# Patient Record
Sex: FEMALE | Race: WHITE
[De-identification: names, ages, dates, MRNs, and addresses within clinical notes are randomized per-mention and may not be internally consistent; named-entity substitution may affect disease eponyms.]

---

## 2021-01-27 ENCOUNTER — HOSPITAL ENCOUNTER (EMERGENCY)
Dept: HOSPITAL 35 - ER | Age: 76
Discharge: HOME | End: 2021-01-27
Payer: COMMERCIAL

## 2021-01-27 VITALS — SYSTOLIC BLOOD PRESSURE: 134 MMHG | DIASTOLIC BLOOD PRESSURE: 74 MMHG

## 2021-01-27 VITALS — WEIGHT: 267.99 LBS | HEIGHT: 70 IN | BODY MASS INDEX: 38.37 KG/M2

## 2021-01-27 DIAGNOSIS — E66.01: ICD-10-CM

## 2021-01-27 DIAGNOSIS — E10.51: ICD-10-CM

## 2021-01-27 DIAGNOSIS — Z79.899: ICD-10-CM

## 2021-01-27 DIAGNOSIS — E10.40: ICD-10-CM

## 2021-01-27 DIAGNOSIS — I10: ICD-10-CM

## 2021-01-27 DIAGNOSIS — Z79.4: ICD-10-CM

## 2021-01-27 DIAGNOSIS — Z90.49: ICD-10-CM

## 2021-01-27 DIAGNOSIS — Z79.82: ICD-10-CM

## 2021-01-27 DIAGNOSIS — U07.1: Primary | ICD-10-CM

## 2021-01-27 LAB
ALBUMIN SERPL-MCNC: 2.9 G/DL (ref 3.4–5)
ALT SERPL-CCNC: 22 U/L (ref 30–65)
ANION GAP SERPL CALC-SCNC: 12 MMOL/L (ref 7–16)
ANISOCYTOSIS BLD QL SMEAR: SLIGHT
AST SERPL-CCNC: 25 U/L (ref 15–37)
BASOPHILS NFR BLD AUTO: 0 % (ref 0–2)
BILIRUB SERPL-MCNC: 1.5 MG/DL (ref 0.2–1)
BILIRUB UR-MCNC: NEGATIVE MG/DL
BUN SERPL-MCNC: 29 MG/DL (ref 7–18)
CALCIUM SERPL-MCNC: 8.9 MG/DL (ref 8.5–10.1)
CHLORIDE SERPL-SCNC: 97 MMOL/L (ref 98–107)
CO2 SERPL-SCNC: 28 MMOL/L (ref 21–32)
COLOR UR: YELLOW
CREAT SERPL-MCNC: 1.4 MG/DL (ref 0.6–1)
EOSINOPHIL NFR BLD: 0 % (ref 0–3)
ERYTHROCYTE [DISTWIDTH] IN BLOOD BY AUTOMATED COUNT: 14.9 % (ref 10.5–14.5)
GLUCOSE SERPL-MCNC: 205 MG/DL (ref 74–106)
GRANULOCYTES NFR BLD MANUAL: 77 % (ref 36–66)
HCT VFR BLD CALC: 41.9 % (ref 37–47)
HGB BLD-MCNC: 13.6 GM/DL (ref 12–15)
KETONES UR STRIP-MCNC: (no result) MG/DL
LYMPHOCYTES NFR BLD AUTO: 6 % (ref 24–44)
MCH RBC QN AUTO: 29.9 PG (ref 26–34)
MCHC RBC AUTO-ENTMCNC: 32.5 G/DL (ref 28–37)
MCV RBC: 91.8 FL (ref 80–100)
MONOCYTES NFR BLD: 13 % (ref 1–8)
MYELOCYTES NFR BLD: 1 %
NEUTROPHILS # BLD: 4.2 THOU/UL (ref 1.4–8.2)
NEUTS BAND NFR BLD: 1 % (ref 0–8)
PLATELET # BLD: 273 THOU/UL (ref 150–400)
POIKILOCYTOSIS BLD QL SMEAR: SLIGHT
POTASSIUM SERPL-SCNC: 3.8 MMOL/L (ref 3.5–5.1)
PROT SERPL-MCNC: 7 G/DL (ref 6.4–8.2)
RBC # BLD AUTO: 4.56 MIL/UL (ref 4.2–5)
RBC # UR STRIP: NEGATIVE /UL
SODIUM SERPL-SCNC: 137 MMOL/L (ref 136–145)
SP GR UR STRIP: 1.01 (ref 1–1.03)
TROPONIN I SERPL-MCNC: <0.06 NG/ML (ref ?–0.06)
URINE CLARITY: CLEAR
URINE GLUCOSE-RANDOM*: NEGATIVE
URINE LEUKOCYTES-REFLEX: NEGATIVE
URINE NITRITE-REFLEX: NEGATIVE
URINE PROTEIN (DIPSTICK): (no result)
UROBILINOGEN UR STRIP-ACNC: 0.2 E.U./DL (ref 0.2–1)
VARIANT LYMPHS NFR BLD MANUAL: 2 %
WBC # BLD AUTO: 5.4 THOU/UL (ref 4–11)

## 2021-01-27 NOTE — EKG
[place] [address] [facility] [employer]
[place]  [address]
Phone:  [phone]                    ELECTROCARDIOGRAM REPORT      
_______________________________________________________________________________
 
Name:       NINO MICHELLE                [facility]                     [id number]     Account #:      [id number]  
Admission:  21    Attend Phys:                          
Discharge:              Date of Birth:  [birth year]  
                                                          Report #: [id number]
   [id number]
_______________________________________________________________________________
                         [facility] ED
                                       
Test Date:    2021               Test Time:    12:26:10
Pat Name:     NINO MICHELLE           Department:   
Patient ID:   [id number]            Room:          
Gender:       F                        Technician:   iván
:          1945               Requested By: Jovan Laguna
Order Number: [id number] MD:   Zach Gonzales
                                 Measurements
Intervals                              Axis          
Rate:         81                       P:            13
WY:           154                      QRS:          144
QRSD:         145                      T:            -12
QT:           438                                    
QTc:          509                                    
                           Interpretive Statements
Sinus rhythm
Right bundle branch block
Compared to ECG 2014 13:08:12
Left posterior fascicular block no longer present
Bifascicular block no longer present
Electronically Signed On 2021 12:53:23 CST by Zach Gonzales
[email or web address]
 
 
 
 
 
 
 
 
 
 
 
 
 
 
 
 
 
 
 
 
  <ELECTRONICALLY SIGNED>
   By: Zach Gonzales MD, [facility]    
  21     1253
D: 21 1226                           _____________________________________
T: 21 1226                           Zach Gonzales MD, FACC      /EPI

## 2021-01-28 NOTE — NUR
Late entry from yesterday afternoon. Called rec'd from the ER nurse to discuss
case and options for additional support for the pt. Pt presented with c/o
weakness. Workup negative other than pending Covid PCR test. Pt lives in a [facility], [facility]. She has 3hrs of caregiver services daily
per [facility] and her [place] medicaid. Her cousin Erasmo is her dpoa and only support.
Her pcp is Dr. Chase Driver.  services offered for RN, therapy and MSW.
Nursing reports that the pt does not want them and does not feel she needs
therapy. They noted she was transfering and move well in the ER. Her
primary concern was meal prep and did note her cg helps with this. The pt is
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 Pt dc'd to home with no referrals. PCR noted
positive this am. ER staff to call her and provide guidance on quarentining
and and f/u care. No cm interventions indicated.